# Patient Record
Sex: FEMALE
[De-identification: names, ages, dates, MRNs, and addresses within clinical notes are randomized per-mention and may not be internally consistent; named-entity substitution may affect disease eponyms.]

---

## 2019-11-13 ENCOUNTER — HOSPITAL ENCOUNTER (OUTPATIENT)
Dept: HOSPITAL 92 - CT | Age: 64
Discharge: HOME | End: 2019-11-13
Attending: FAMILY MEDICINE
Payer: MEDICARE

## 2019-11-13 DIAGNOSIS — K66.8: Primary | ICD-10-CM

## 2019-11-13 DIAGNOSIS — R59.0: ICD-10-CM

## 2019-11-13 LAB — ESTIMATED GFR-MDRD - POC: 50

## 2019-11-13 PROCEDURE — 82565 ASSAY OF CREATININE: CPT

## 2019-11-13 PROCEDURE — 74177 CT ABD & PELVIS W/CONTRAST: CPT

## 2019-11-13 NOTE — CT
CT OF ABDOMEN AND PELVIS:



DATE: 11/13/2019.



HISTORY: 

Disorder of retroperitoneum, history of right renal cell carcinoma and left-sided breast cancer. Eval
uate for a retroperitoneal mass lesion.



TECHNIQUE:

Axial CT imaging at 5 mm intervals from lung bases through pubic symphysis with IV and oral contrast.
 Coronal and sagittal reformatted imaging obtained.



FINDINGS: 

Partially visualized left breast implant present consistent with history of left mastectomy. Imaged l
shannon bases unremarkable. No free intraperitoneal air or fluid.



Hepatic parenchyma is diffusely hypodense, evidence of steatosis. Cholecystectomy clips are present. 
The spleen, pancreas, adrenal glands, and left kidney demonstrate no acute findings. The right

kidney is absent, consistent with the provided history of right nephrectomy secondary to malignancy. 
There is bulky lymphadenopathy within the retroperitoneum on the right. This includes

lymphadenopathy measuring 7.1 x 4.3 cm in the aortocaval/pericaval region. Additional bulky enlarged 
lymph nodes are seen within the aortocaval region measuring up to 3.8 cm.



There is a 1.6 cm cyst within the lower pole of the left kidney, with Hounsfield units in the 12-14 r
ankush.



Uterus appears surgically absent.



Sigmoid diverticulosis without evidence for diverticulitis. There is a ventral hernia in the periumbi
lical region containing nondilated mid transverse colon. Appendix unremarkable.



Osseous structures demonstrate no worrisome lytic or blastic lesions.



IMPRESSION: 

Bulky retroperitoneal lymphadenopathy on the right, highly suspicious for metastatic disease.



Transcribed Date/Time: 11/13/2019 11:09 AM



Reported By: Fitz Singh 

Electronically Signed:  11/13/2019 5:08 PM

## 2019-12-06 ENCOUNTER — HOSPITAL ENCOUNTER (OUTPATIENT)
Dept: HOSPITAL 92 - CT | Age: 64
Discharge: HOME | End: 2019-12-06
Attending: FAMILY MEDICINE
Payer: MEDICARE

## 2019-12-06 VITALS — TEMPERATURE: 98.5 F | DIASTOLIC BLOOD PRESSURE: 90 MMHG | SYSTOLIC BLOOD PRESSURE: 147 MMHG

## 2019-12-06 VITALS — BODY MASS INDEX: 37.5 KG/M2

## 2019-12-06 DIAGNOSIS — Z99.89: ICD-10-CM

## 2019-12-06 DIAGNOSIS — Z90.5: ICD-10-CM

## 2019-12-06 DIAGNOSIS — Z79.84: ICD-10-CM

## 2019-12-06 DIAGNOSIS — Z85.3: ICD-10-CM

## 2019-12-06 DIAGNOSIS — C78.6: Primary | ICD-10-CM

## 2019-12-06 DIAGNOSIS — I10: ICD-10-CM

## 2019-12-06 DIAGNOSIS — Z79.899: ICD-10-CM

## 2019-12-06 DIAGNOSIS — E11.9: ICD-10-CM

## 2019-12-06 DIAGNOSIS — Z85.528: ICD-10-CM

## 2019-12-06 DIAGNOSIS — C80.1: ICD-10-CM

## 2019-12-06 DIAGNOSIS — Z90.12: ICD-10-CM

## 2019-12-06 DIAGNOSIS — G47.30: ICD-10-CM

## 2019-12-06 LAB
APTT PPP: 33.1 SEC (ref 22.9–36.1)
BASOPHILS # BLD AUTO: 0.1 THOU/UL (ref 0–0.2)
BASOPHILS NFR BLD AUTO: 0.7 % (ref 0–1)
EOSINOPHIL # BLD AUTO: 0.5 THOU/UL (ref 0–0.7)
EOSINOPHIL NFR BLD AUTO: 4.2 % (ref 0–10)
HGB BLD-MCNC: 13.4 G/DL (ref 12–16)
INR PPP: 1
LYMPHOCYTES # BLD: 3.4 THOU/UL (ref 1.2–3.4)
LYMPHOCYTES NFR BLD AUTO: 31.1 % (ref 21–51)
MCH RBC QN AUTO: 30.3 PG (ref 27–31)
MCV RBC AUTO: 91.1 FL (ref 78–98)
MONOCYTES # BLD AUTO: 0.6 THOU/UL (ref 0.11–0.59)
MONOCYTES NFR BLD AUTO: 5.5 % (ref 0–10)
NEUTROPHILS # BLD AUTO: 6.4 THOU/UL (ref 1.4–6.5)
NEUTROPHILS NFR BLD AUTO: 58.5 % (ref 42–75)
PLATELET # BLD AUTO: 245 THOU/UL (ref 130–400)
PROTHROMBIN TIME: 12.9 SEC (ref 12–14.7)
RBC # BLD AUTO: 4.42 MILL/UL (ref 4.2–5.4)
WBC # BLD AUTO: 11 THOU/UL (ref 4.8–10.8)

## 2019-12-06 PROCEDURE — 85610 PROTHROMBIN TIME: CPT

## 2019-12-06 PROCEDURE — 85730 THROMBOPLASTIN TIME PARTIAL: CPT

## 2019-12-06 PROCEDURE — 49180 BIOPSY ABDOMINAL MASS: CPT

## 2019-12-06 PROCEDURE — 88334 PATH CONSLTJ SURG CYTO XM EA: CPT

## 2019-12-06 PROCEDURE — 88341 IMHCHEM/IMCYTCHM EA ADD ANTB: CPT

## 2019-12-06 PROCEDURE — 88342 IMHCHEM/IMCYTCHM 1ST ANTB: CPT

## 2019-12-06 PROCEDURE — 88305 TISSUE EXAM BY PATHOLOGIST: CPT

## 2019-12-06 PROCEDURE — 36415 COLL VENOUS BLD VENIPUNCTURE: CPT

## 2019-12-06 PROCEDURE — 77012 CT SCAN FOR NEEDLE BIOPSY: CPT

## 2019-12-06 PROCEDURE — 85025 COMPLETE CBC W/AUTO DIFF WBC: CPT

## 2019-12-06 PROCEDURE — 0WBH3ZX EXCISION OF RETROPERITONEUM, PERCUTANEOUS APPROACH, DIAGNOSTIC: ICD-10-PCS | Performed by: RADIOLOGY

## 2019-12-06 PROCEDURE — 88333 PATH CONSLTJ SURG CYTO XM 1: CPT

## 2019-12-06 NOTE — CT
CT-guided retroperitoneal mass biopsy



INDICATION: History of bulky retroperitoneal lymphadenopathy seen on a comparison CT evaluation 11/13
/2019. Patient has a history of prior right renal cell carcinoma and left breast cancer.



TECHNIQUE: Informed consent was obtained. Site overlying the right posterior lateral aspect of the festus
dy was marked with a grid. The patient was placed prone on the CT from table. Preprocedure CT

images were obtained identifying the bulky retroperitoneal lymph nodes. Site overlying the largest ly
mph node adjacent to the inferior margin of the liver was marked. Patient underwent conscious

sedation under guidance of the radiology nurse. The patient received 50 mcg of IV fentanyl and 1 mg o
f IV Versed. Buffered 1% lidocaine was measured overlying subcutaneous tissues. A small dermatotomy

was made. A 17-gauge trocar needle was guided down to the lesion. 3 separate core samples were obtain
ed utilizing an 18-gauge core biopsy gun. Pathology was on-site to verify adequacy of tissue

sampling. Following the last core sample, the trocar needle stylet was slightly removed to allow bloo
d to coagulate within the trocar needle. After approximately 2 minutes, the blood plug was then

advanced into the level of the biopsied retroperitoneal lymph node. The needle was removed. Pressure 
was held at the biopsy site until hemostasis was obtained. Postprocedural images were performed

which demonstrated a small amount of retroperitoneal hemorrhage adjacent to the largest retroperitone
al lymph node sampled. The patient tolerated the biopsy without difficulty. 2 of the core specimen

were submitted in formalin. One core specimen was submitted in flow cytometry solution.



IMPRESSION: Successful CT-guided retroperitoneal lymph node core sampling.



Reported By: Broderick Bright 

Electronically Signed:  12/6/2019 1:38 PM

## 2019-12-26 ENCOUNTER — HOSPITAL ENCOUNTER (OUTPATIENT)
Dept: HOSPITAL 92 - CT | Age: 64
Discharge: HOME | End: 2019-12-26
Attending: INTERNAL MEDICINE
Payer: MEDICARE

## 2019-12-26 DIAGNOSIS — Z87.81: ICD-10-CM

## 2019-12-26 DIAGNOSIS — Z90.5: ICD-10-CM

## 2019-12-26 DIAGNOSIS — C64.9: Primary | ICD-10-CM

## 2019-12-26 DIAGNOSIS — J32.9: ICD-10-CM

## 2019-12-26 DIAGNOSIS — C78.6: ICD-10-CM

## 2019-12-26 LAB — ESTIMATED GFR-MDRD - POC: 45

## 2019-12-26 PROCEDURE — 71260 CT THORAX DX C+: CPT

## 2019-12-26 PROCEDURE — 82565 ASSAY OF CREATININE: CPT

## 2019-12-26 PROCEDURE — A9503 TC99M MEDRONATE: HCPCS

## 2019-12-26 PROCEDURE — 78306 BONE IMAGING WHOLE BODY: CPT

## 2019-12-26 PROCEDURE — 70470 CT HEAD/BRAIN W/O & W/DYE: CPT

## 2019-12-26 NOTE — CT
CT CHEST WITH CONTRAST:

 

Date:  12/26/19 

 

INDICATION:

Malignant neoplasm right kidney and secondary malignant neoplasm retroperitoneum. Remote history of l
eft breast cancer. 

 

No comparison chest CT. 

 

FINDINGS:

The lung fields are clear. No evidence of infiltrate or effusion. No pulmonary mass or nodule identif
ied. Mediastinum unremarkable. No adenopathy. Thyroid unremarkable. 

 

Images through the upper abdomen show unremarkable liver, spleen, and visualized pancreas. The previo
usly described mass in the right retroperitoneum is partially imaged. 

 

Osseous structures unremarkable. 

 

There is a left breast prosthesis. No evidence of axillary adenopathy. 

 

IMPRESSION: 

Unremarkable CT chest. 

 

 

POS: [facility]

## 2019-12-26 NOTE — CT
CT of the head with and without contrast:

12/26/2019



COMPARISON: None



HISTORY: Breast cancer, "kidney cancer"



TECHNIQUE: Axial CT imaging at 5 mm intervals from vertex through skull base with and without IV cont
rast.



FINDINGS: There is complete opacification of the frontal sinus and anterior ethmoid air cells on the 
right with mild expansion of the paranasal sinuses. This can be seen on the basis of

polyposis/chronic sinusitis and/or fungal infection.



No worrisome lytic or blastic bone lesion identified.



The noncontrast enhanced imaging demonstrates no intracranial hemorrhage, midline shift, mass effect,
 or ventricular enlargement.



The postcontrast imaging demonstrates no abnormal enhancement within the brain parenchyma to suggest 
the presence of intracranial metastatic disease. This study was not tailored to evaluate the

arterial structures of the brain.



On the axial imaging there is marked thinning which could signify a focal area of the dehiscence invo
lving the posterior wall of the expanded right frontal sinus. This could be best assessed with

follow-up CT examination of the paranasal sinuses.



IMPRESSION: No intracranial hemorrhage or evidence of intracranial metastatic disease.



Paranasal sinus disease with possible posterior dehiscence of right frontal sinus.



Reported By: Fitz Singh 

Electronically Signed:  12/26/2019 9:31 AM

## 2019-12-26 NOTE — NM
NUCLEAR MEDICINE BONE SCAN WHOLE BODY:

(Skeletal scintigraphy)



DATE:

12/26/2019



HISTORY:

64-year-old female with breast cancer and renal cell carcinoma.



TECHNIQUE:

IV injection of technetium 99m-MDP: 31.3 mCi

3 hour delayed whole body skeletal scintigraphy in anterior and posterior views.



FINDINGS:

There is a small focus of increased uptake at the right mid fibular diaphysis, which corresponds to t
he fracture demonstrated on prior radiograph of 7/30/2005.

Otherwise, there are no foci of asymmetrically increased uptake that are particularly suspicious for 
bone metastases.

The right kidney is absent.

There is hyperostosis frontalis interna.



IMPRESSION:



1. No compelling evidence of skeletal metastasis.

2. Evidence of old right mid fibular shaft fracture.

3. Status post right nephrectomy.



Reported By: Raymond Webber 

Electronically Signed:  12/26/2019 1:17 PM

## 2020-04-08 ENCOUNTER — HOSPITAL ENCOUNTER (OUTPATIENT)
Dept: HOSPITAL 92 - CT | Age: 65
Discharge: HOME | End: 2020-04-08
Attending: INTERNAL MEDICINE
Payer: MEDICARE

## 2020-04-08 DIAGNOSIS — M47.816: ICD-10-CM

## 2020-04-08 DIAGNOSIS — C64.1: Primary | ICD-10-CM

## 2020-04-08 DIAGNOSIS — C78.6: ICD-10-CM

## 2020-04-08 DIAGNOSIS — K42.9: ICD-10-CM

## 2020-04-08 DIAGNOSIS — J98.4: ICD-10-CM

## 2020-04-08 LAB — ESTIMATED GFR-MDRD - POC: 45

## 2020-04-08 PROCEDURE — 71260 CT THORAX DX C+: CPT

## 2020-04-08 PROCEDURE — 74177 CT ABD & PELVIS W/CONTRAST: CPT

## 2020-04-08 PROCEDURE — 82565 ASSAY OF CREATININE: CPT

## 2020-04-08 NOTE — CT
CT chest with IV contrast

CT abdomen and pelvis with IV and oral contrast



HISTORY: Malignant neoplasm kidney. Restaging.



COMPARISON: 11/13/2019.



FINDINGS: Lungs are well-inflated. Mild linear scarring at the left posterior lung base. Left breast 
implant stable.



Gallbladder is surgically absent. The 1.5 cm cyst at the inferior pole of the left kidney is unchange
d. Non-obstructed sigmoid colon protruding through an umbilical hernia is again demonstrated.



A lobular heterogeneous soft tissue density mass within the right retroperitoneum now measures up to 
13.8 cm length by 8.1 cm width by 8.1 cm depth. The 2 areas of adenopathy on the prior study have

become confluent. There is heterogeneous fluid density component involving the inferior margin, more 
pronounced on the current study. Significant compression of the inferior vena cava has progressed.

There is subtle linear decreased density within the common iliac arteries and lower inferior vena cav
a, although, rather than thrombus, is it is favored to represent differential contrast

opacification due to inflow of the internal iliac veins..



Degenerative changes lumbar spine.



  



IMPRESSION :

Significant interval enlargement of the retroperitoneal adenopathy/mass, now a confluent process. It 
does significantly compress the inferior vena cava, possibly resulting in partial obstruction of

flow.



Umbilical hernia containing nonobstructed transverse colon.



Chronic-type findings are stable.



Reported By: CORINNA Werner 

Electronically Signed:  4/8/2020 11:57 AM

## 2020-05-29 ENCOUNTER — HOSPITAL ENCOUNTER (OUTPATIENT)
Dept: HOSPITAL 92 - LABBT | Age: 65
Discharge: HOME | End: 2020-05-29
Attending: SURGERY
Payer: MEDICARE

## 2020-05-29 DIAGNOSIS — Z01.818: Primary | ICD-10-CM

## 2020-05-29 DIAGNOSIS — Z11.59: ICD-10-CM

## 2020-05-29 DIAGNOSIS — I70.0: ICD-10-CM

## 2020-05-29 LAB
ANION GAP SERPL CALC-SCNC: 14 MMOL/L (ref 10–20)
BASOPHILS # BLD AUTO: 0.1 THOU/UL (ref 0–0.2)
BASOPHILS NFR BLD AUTO: 1.1 % (ref 0–1)
BUN SERPL-MCNC: 40 MG/DL (ref 9.8–20.1)
CALCIUM SERPL-MCNC: 9.9 MG/DL (ref 7.8–10.44)
CHLORIDE SERPL-SCNC: 108 MMOL/L (ref 98–107)
CO2 SERPL-SCNC: 21 MMOL/L (ref 23–31)
CREAT CL PREDICTED SERPL C-G-VRATE: 0 ML/MIN (ref 70–130)
EOSINOPHIL # BLD AUTO: 0.9 THOU/UL (ref 0–0.7)
EOSINOPHIL NFR BLD AUTO: 7.7 % (ref 0–10)
GLUCOSE SERPL-MCNC: 143 MG/DL (ref 80–115)
HGB BLD-MCNC: 12.5 G/DL (ref 12–16)
LYMPHOCYTES # BLD: 2.9 THOU/UL (ref 1.2–3.4)
LYMPHOCYTES NFR BLD AUTO: 25.3 % (ref 21–51)
MCH RBC QN AUTO: 34.7 PG (ref 27–31)
MCV RBC AUTO: 104 FL (ref 78–98)
MONOCYTES # BLD AUTO: 0.7 THOU/UL (ref 0.11–0.59)
MONOCYTES NFR BLD AUTO: 6.1 % (ref 0–10)
NEUTROPHILS # BLD AUTO: 6.8 THOU/UL (ref 1.4–6.5)
NEUTROPHILS NFR BLD AUTO: 59.8 % (ref 42–75)
PLATELET # BLD AUTO: 298 THOU/UL (ref 130–400)
POTASSIUM SERPL-SCNC: 4.2 MMOL/L (ref 3.5–5.1)
RBC # BLD AUTO: 3.61 MILL/UL (ref 4.2–5.4)
SODIUM SERPL-SCNC: 139 MMOL/L (ref 136–145)
WBC # BLD AUTO: 11.3 THOU/UL (ref 4.8–10.8)

## 2020-05-29 PROCEDURE — 71046 X-RAY EXAM CHEST 2 VIEWS: CPT

## 2020-05-29 PROCEDURE — 85025 COMPLETE CBC W/AUTO DIFF WBC: CPT

## 2020-05-29 PROCEDURE — 80048 BASIC METABOLIC PNL TOTAL CA: CPT

## 2020-05-29 PROCEDURE — 87635 SARS-COV-2 COVID-19 AMP PRB: CPT

## 2020-05-29 PROCEDURE — U0003 INFECTIOUS AGENT DETECTION BY NUCLEIC ACID (DNA OR RNA); SEVERE ACUTE RESPIRATORY SYNDROME CORONAVIRUS 2 (SARS-COV-2) (CORONAVIRUS DISEASE [COVID-19]), AMPLIFIED PROBE TECHNIQUE, MAKING USE OF HIGH THROUGHPUT TECHNOLOGIES AS DESCRIBED BY CMS-2020-01-R: HCPCS

## 2020-05-29 NOTE — RAD
EXAM:

CHEST TWO VIEWS:

5/29/20

 

HISTORY: 

Preoperative evaluation.

 

FINDINGS: 

Heart size is within normal limits. Lungs are clear. No confluent pneumonia, overt edema or pleural e
ffusion.

 

IMPRESSION: 

No acute intrathoracic disease. Atherosclerosis of the aorta. 

 

POS: [facility]

## 2020-08-03 ENCOUNTER — HOSPITAL ENCOUNTER (OUTPATIENT)
Dept: HOSPITAL 92 - BICULT | Age: 65
Discharge: HOME | End: 2020-08-03
Attending: INTERNAL MEDICINE
Payer: MEDICARE

## 2020-08-03 DIAGNOSIS — N64.1: ICD-10-CM

## 2020-08-03 DIAGNOSIS — N63.24: ICD-10-CM

## 2020-08-03 DIAGNOSIS — R22.2: Primary | ICD-10-CM

## 2020-08-03 NOTE — ULT
SOFT TISSUE ULTRASOUND CHEST WALL:

 

History: 

Palpable mass in the medial aspect of the left anterior chest wall. Patient has had prior mastectomy 
with an augmentation prosthesis implant in place. 

 

FINDINGS: 

The palpable mass is noted at approximately the 9 o'clock aspect of the breast. It measures 1 x 1.7 x
 1.8 cm in size and appears to contain both some echogenic and cystic components. No evidence for blo
od flow within this mass. There are multiple other smaller circumscribed anechoic and slightly  hypoe
choic nodular foci within the left breast tissue as well measuring up to approximately .5 cm in size.
 These are seen at 12 o'clock, 1 o'clock, 2 o'clock, and 3 o'clock positions. 

 

Review of prior chest CT scan dated 4-8-2020, in the medial aspect of the left breast there is an ova
l fat containing circumscribed mass in the inner aspect of the left breast on the prior CT which I fe
el corresponds to the palpable finding at 9 o'clock on today's breast ultrasound examination, consist
ent with that of a fat necrosis. The other smaller anechoic and hypoechoic nodular foci also have a t
ypical appearance for small areas of fat necrosis. 

 

IMPRESSION: 

Mixed echogenic and non-echogenic mass in the 9 o'clock position of the left breast accounting for th
e palpable finding corresponds to a circumscribed fatty mass on prior CT scan, evidence for a focal a
keerthi of fat necrosis. 

 

Multiple other smaller circumscribed hypoechoic and anechoic foci in the left breast also are evidenc
e for multiple small areas of fat necrosis. 

 

Findings were discussed with the patient at the time of the examination.  BIRADS category 2 - benign 
findings. Continued routine surveillance.  

 

POS: [facility]

## 2021-01-14 ENCOUNTER — HOSPITAL ENCOUNTER (EMERGENCY)
Dept: HOSPITAL 92 - ERS | Age: 66
Discharge: HOME | End: 2021-01-14
Payer: MEDICARE

## 2021-01-14 DIAGNOSIS — Z79.84: ICD-10-CM

## 2021-01-14 DIAGNOSIS — F17.210: ICD-10-CM

## 2021-01-14 DIAGNOSIS — I10: ICD-10-CM

## 2021-01-14 DIAGNOSIS — R19.00: Primary | ICD-10-CM

## 2021-01-14 DIAGNOSIS — E11.9: ICD-10-CM

## 2021-01-14 LAB
ALBUMIN SERPL BCG-MCNC: 3.7 G/DL (ref 3.4–4.8)
ALP SERPL-CCNC: 104 U/L (ref 40–110)
ALT SERPL W P-5'-P-CCNC: 25 U/L (ref 8–55)
ANION GAP SERPL CALC-SCNC: 17 MMOL/L (ref 10–20)
AST SERPL-CCNC: 36 U/L (ref 5–34)
BASOPHILS # BLD AUTO: 0.1 THOU/UL (ref 0–0.2)
BASOPHILS NFR BLD AUTO: 0.3 % (ref 0–1)
BILIRUB SERPL-MCNC: 0.7 MG/DL (ref 0.2–1.2)
BUN SERPL-MCNC: 19 MG/DL (ref 9.8–20.1)
CALCIUM SERPL-MCNC: 9.6 MG/DL (ref 7.8–10.44)
CHLORIDE SERPL-SCNC: 101 MMOL/L (ref 98–107)
CO2 SERPL-SCNC: 21 MMOL/L (ref 23–31)
CREAT CL PREDICTED SERPL C-G-VRATE: 0 ML/MIN (ref 70–130)
EOSINOPHIL # BLD AUTO: 0.1 THOU/UL (ref 0–0.7)
EOSINOPHIL NFR BLD AUTO: 0.4 % (ref 0–10)
GLOBULIN SER CALC-MCNC: 4 G/DL (ref 2.4–3.5)
GLUCOSE SERPL-MCNC: 192 MG/DL (ref 80–115)
HGB BLD-MCNC: 12.6 G/DL (ref 12–16)
LEUKOCYTE ESTERASE UR QL STRIP.AUTO: 75 LEU/UL
LIPASE SERPL-CCNC: 16 U/L (ref 8–78)
LYMPHOCYTES # BLD: 1.5 THOU/UL (ref 1.2–3.4)
LYMPHOCYTES NFR BLD AUTO: 8.7 % (ref 21–51)
MCH RBC QN AUTO: 27.4 PG (ref 27–31)
MCV RBC AUTO: 86.2 FL (ref 78–98)
MONOCYTES # BLD AUTO: 1.3 THOU/UL (ref 0.11–0.59)
MONOCYTES NFR BLD AUTO: 7.4 % (ref 0–10)
NEUTROPHILS # BLD AUTO: 14.7 THOU/UL (ref 1.4–6.5)
NEUTROPHILS NFR BLD AUTO: 83.2 % (ref 42–75)
PLATELET # BLD AUTO: 182 THOU/UL (ref 130–400)
POTASSIUM SERPL-SCNC: 3.9 MMOL/L (ref 3.5–5.1)
PROT UR STRIP.AUTO-MCNC: 50 MG/DL
RBC # BLD AUTO: 4.6 MILL/UL (ref 4.2–5.4)
RBC UR QL AUTO: (no result) HPF (ref 0–3)
SODIUM SERPL-SCNC: 135 MMOL/L (ref 136–145)
SP GR UR STRIP: 1.02 (ref 1–1.04)
WBC # BLD AUTO: 17.7 THOU/UL (ref 4.8–10.8)
WBC UR QL AUTO: (no result) HPF (ref 0–3)

## 2021-01-14 PROCEDURE — 80053 COMPREHEN METABOLIC PANEL: CPT

## 2021-01-14 PROCEDURE — 96375 TX/PRO/DX INJ NEW DRUG ADDON: CPT

## 2021-01-14 PROCEDURE — 81003 URINALYSIS AUTO W/O SCOPE: CPT

## 2021-01-14 PROCEDURE — 81015 MICROSCOPIC EXAM OF URINE: CPT

## 2021-01-14 PROCEDURE — 85025 COMPLETE CBC W/AUTO DIFF WBC: CPT

## 2021-01-14 PROCEDURE — 96374 THER/PROPH/DIAG INJ IV PUSH: CPT

## 2021-01-14 PROCEDURE — 74177 CT ABD & PELVIS W/CONTRAST: CPT

## 2021-01-14 PROCEDURE — 83690 ASSAY OF LIPASE: CPT

## 2021-01-14 NOTE — CT
CT ABDOMEN PELVIS WITH IV CONTRAST:



HISTORY: Right-sided abdominal pain. Right flank pain. History of right renal cell carcinoma and left
 breast cancer.



COMPARISON: 4/8/2020



FINDINGS:



 Left breast implant and mild linear scarring at the left posterior lung base are again seen. The pat
ient is post cholecystectomy. Changes of hepatic steatosis are again seen without hepatic mass or

abnormal biliary ductal dilatation. The spleen, pancreas and adrenal glands are unremarkable. The pat
ient is status post right nephrectomy. 1.5 cm cyst in the left kidney is stable.



The right-sided bulky retroperitoneal lymphadenopathy shows interval worsening, currently measuring 8
.9 x 9.1 cm. There is continued compression of the inferior vena cava. No free air or free fluid is

seen in the abdomen or pelvis. There are vascular calcifications without evidence of aneurysmal dilat
ation of the abdominal aorta. There are degenerative changes in the spine without osteolytic or

osteoblastic lesions.



The small bowel loops are not abnormally dilated. The ventral hernia containing a portion of the garcia
sverse colon is again seen. There is no evidence of bowel obstruction.



IMPRESSION: Interval worsening of right retroperitoneal lymphadenopathy since 4/8/2020.



Reported By: Bal Ruiz 

Electronically Signed:  1/14/2021 12:50 PM

## 2021-03-16 ENCOUNTER — HOSPITAL ENCOUNTER (OUTPATIENT)
Dept: HOSPITAL 92 - ERS | Age: 66
Setting detail: OBSERVATION
LOS: 1 days | Discharge: HOME | End: 2021-03-17
Attending: FAMILY MEDICINE | Admitting: FAMILY MEDICINE
Payer: MEDICARE

## 2021-03-16 VITALS — BODY MASS INDEX: 31.6 KG/M2

## 2021-03-16 DIAGNOSIS — R10.9: ICD-10-CM

## 2021-03-16 DIAGNOSIS — C77.2: ICD-10-CM

## 2021-03-16 DIAGNOSIS — G89.29: ICD-10-CM

## 2021-03-16 DIAGNOSIS — Z20.822: ICD-10-CM

## 2021-03-16 DIAGNOSIS — R07.0: ICD-10-CM

## 2021-03-16 DIAGNOSIS — C64.1: ICD-10-CM

## 2021-03-16 DIAGNOSIS — R63.0: Primary | ICD-10-CM

## 2021-03-16 DIAGNOSIS — F17.210: ICD-10-CM

## 2021-03-16 DIAGNOSIS — Z85.3: ICD-10-CM

## 2021-03-16 DIAGNOSIS — E83.52: ICD-10-CM

## 2021-03-16 DIAGNOSIS — R74.01: ICD-10-CM

## 2021-03-16 DIAGNOSIS — I12.9: ICD-10-CM

## 2021-03-16 DIAGNOSIS — Z79.84: ICD-10-CM

## 2021-03-16 DIAGNOSIS — R94.31: ICD-10-CM

## 2021-03-16 DIAGNOSIS — Z79.899: ICD-10-CM

## 2021-03-16 DIAGNOSIS — E11.22: ICD-10-CM

## 2021-03-16 DIAGNOSIS — N18.31: ICD-10-CM

## 2021-03-16 LAB
ALBUMIN SERPL BCG-MCNC: 3.5 G/DL (ref 3.4–4.8)
ALP SERPL-CCNC: 249 U/L (ref 40–110)
ALT SERPL W P-5'-P-CCNC: 101 U/L (ref 8–55)
ANION GAP SERPL CALC-SCNC: 18 MMOL/L (ref 10–20)
AST SERPL-CCNC: 61 U/L (ref 5–34)
BASOPHILS # BLD AUTO: 0 THOU/UL (ref 0–0.2)
BASOPHILS NFR BLD AUTO: 0.3 % (ref 0–1)
BILIRUB SERPL-MCNC: 0.8 MG/DL (ref 0.2–1.2)
BUN SERPL-MCNC: 52 MG/DL (ref 9.8–20.1)
CALCIUM SERPL-MCNC: 10.7 MG/DL (ref 7.8–10.44)
CHLORIDE SERPL-SCNC: 100 MMOL/L (ref 98–107)
CK SERPL-CCNC: 47 U/L (ref 29–168)
CO2 SERPL-SCNC: 20 MMOL/L (ref 23–31)
CREAT CL PREDICTED SERPL C-G-VRATE: 0 ML/MIN (ref 70–130)
EOSINOPHIL # BLD AUTO: 0.2 THOU/UL (ref 0–0.7)
EOSINOPHIL NFR BLD AUTO: 4.1 % (ref 0–10)
GLOBULIN SER CALC-MCNC: 5 G/DL (ref 2.4–3.5)
GLUCOSE SERPL-MCNC: 141 MG/DL (ref 80–115)
HGB BLD-MCNC: 14.3 G/DL (ref 12–16)
LYMPHOCYTES # BLD: 1 THOU/UL (ref 1.2–3.4)
LYMPHOCYTES NFR BLD AUTO: 18.4 % (ref 21–51)
MAGNESIUM SERPL-MCNC: 1.8 MG/DL (ref 1.6–2.6)
MCH RBC QN AUTO: 29.4 PG (ref 27–31)
MCV RBC AUTO: 88 FL (ref 78–98)
MONOCYTES # BLD AUTO: 0.7 THOU/UL (ref 0.11–0.59)
MONOCYTES NFR BLD AUTO: 13.7 % (ref 0–10)
NEUTROPHILS # BLD AUTO: 3.4 THOU/UL (ref 1.4–6.5)
NEUTROPHILS NFR BLD AUTO: 63.5 % (ref 42–75)
PLATELET # BLD AUTO: 174 THOU/UL (ref 130–400)
POTASSIUM SERPL-SCNC: 3.9 MMOL/L (ref 3.5–5.1)
PROT UR STRIP.AUTO-MCNC: 30 MG/DL
RBC # BLD AUTO: 4.85 MILL/UL (ref 4.2–5.4)
RBC UR QL AUTO: (no result) HPF (ref 0–3)
SODIUM SERPL-SCNC: 134 MMOL/L (ref 136–145)
SP GR UR STRIP: 1.03 (ref 1–1.04)
WBC # BLD AUTO: 5.4 THOU/UL (ref 4.8–10.8)
WBC UR QL AUTO: (no result) HPF (ref 0–3)

## 2021-03-16 PROCEDURE — 96375 TX/PRO/DX INJ NEW DRUG ADDON: CPT

## 2021-03-16 PROCEDURE — 36416 COLLJ CAPILLARY BLOOD SPEC: CPT

## 2021-03-16 PROCEDURE — 83036 HEMOGLOBIN GLYCOSYLATED A1C: CPT

## 2021-03-16 PROCEDURE — 36415 COLL VENOUS BLD VENIPUNCTURE: CPT

## 2021-03-16 PROCEDURE — 71045 X-RAY EXAM CHEST 1 VIEW: CPT

## 2021-03-16 PROCEDURE — 96374 THER/PROPH/DIAG INJ IV PUSH: CPT

## 2021-03-16 PROCEDURE — 84100 ASSAY OF PHOSPHORUS: CPT

## 2021-03-16 PROCEDURE — 81003 URINALYSIS AUTO W/O SCOPE: CPT

## 2021-03-16 PROCEDURE — 81015 MICROSCOPIC EXAM OF URINE: CPT

## 2021-03-16 PROCEDURE — G0378 HOSPITAL OBSERVATION PER HR: HCPCS

## 2021-03-16 PROCEDURE — 85025 COMPLETE CBC W/AUTO DIFF WBC: CPT

## 2021-03-16 PROCEDURE — 99285 EMERGENCY DEPT VISIT HI MDM: CPT

## 2021-03-16 PROCEDURE — U0003 INFECTIOUS AGENT DETECTION BY NUCLEIC ACID (DNA OR RNA); SEVERE ACUTE RESPIRATORY SYNDROME CORONAVIRUS 2 (SARS-COV-2) (CORONAVIRUS DISEASE [COVID-19]), AMPLIFIED PROBE TECHNIQUE, MAKING USE OF HIGH THROUGHPUT TECHNOLOGIES AS DESCRIBED BY CMS-2020-01-R: HCPCS

## 2021-03-16 PROCEDURE — U0005 INFEC AGEN DETEC AMPLI PROBE: HCPCS

## 2021-03-16 PROCEDURE — 84484 ASSAY OF TROPONIN QUANT: CPT

## 2021-03-16 PROCEDURE — 82962 GLUCOSE BLOOD TEST: CPT

## 2021-03-16 PROCEDURE — 93005 ELECTROCARDIOGRAM TRACING: CPT

## 2021-03-16 PROCEDURE — 87635 SARS-COV-2 COVID-19 AMP PRB: CPT

## 2021-03-16 PROCEDURE — 74177 CT ABD & PELVIS W/CONTRAST: CPT

## 2021-03-16 PROCEDURE — 97139 UNLISTED THERAPEUTIC PX: CPT

## 2021-03-16 PROCEDURE — 83735 ASSAY OF MAGNESIUM: CPT

## 2021-03-16 PROCEDURE — 80053 COMPREHEN METABOLIC PANEL: CPT

## 2021-03-16 PROCEDURE — 82550 ASSAY OF CK (CPK): CPT

## 2021-03-17 VITALS — TEMPERATURE: 98.2 F | SYSTOLIC BLOOD PRESSURE: 137 MMHG | DIASTOLIC BLOOD PRESSURE: 83 MMHG

## 2021-03-17 LAB
ALBUMIN SERPL BCG-MCNC: 3.1 G/DL (ref 3.4–4.8)
ALP SERPL-CCNC: 218 U/L (ref 40–110)
ALT SERPL W P-5'-P-CCNC: 80 U/L (ref 8–55)
ANION GAP SERPL CALC-SCNC: 13 MMOL/L (ref 10–20)
AST SERPL-CCNC: 53 U/L (ref 5–34)
BASOPHILS # BLD AUTO: 0 THOU/UL (ref 0–0.2)
BASOPHILS NFR BLD AUTO: 0.3 % (ref 0–1)
BILIRUB SERPL-MCNC: 0.8 MG/DL (ref 0.2–1.2)
BUN SERPL-MCNC: 32 MG/DL (ref 9.8–20.1)
CALCIUM SERPL-MCNC: 9.9 MG/DL (ref 7.8–10.44)
CHLORIDE SERPL-SCNC: 106 MMOL/L (ref 98–107)
CO2 SERPL-SCNC: 22 MMOL/L (ref 23–31)
CREAT CL PREDICTED SERPL C-G-VRATE: 91 ML/MIN (ref 70–130)
EOSINOPHIL # BLD AUTO: 0.2 THOU/UL (ref 0–0.7)
EOSINOPHIL NFR BLD AUTO: 2.2 % (ref 0–10)
GLOBULIN SER CALC-MCNC: 4.2 G/DL (ref 2.4–3.5)
GLUCOSE SERPL-MCNC: 85 MG/DL (ref 80–115)
HGB BLD-MCNC: 12.7 G/DL (ref 12–16)
LYMPHOCYTES # BLD: 1.1 THOU/UL (ref 1.2–3.4)
LYMPHOCYTES NFR BLD AUTO: 13 % (ref 21–51)
MAGNESIUM SERPL-MCNC: 1.8 MG/DL (ref 1.6–2.6)
MCH RBC QN AUTO: 30.3 PG (ref 27–31)
MCV RBC AUTO: 88.2 FL (ref 78–98)
MONOCYTES # BLD AUTO: 0.9 THOU/UL (ref 0.11–0.59)
MONOCYTES NFR BLD AUTO: 10.9 % (ref 0–10)
NEUTROPHILS # BLD AUTO: 6.1 THOU/UL (ref 1.4–6.5)
NEUTROPHILS NFR BLD AUTO: 73.7 % (ref 42–75)
PLATELET # BLD AUTO: 151 THOU/UL (ref 130–400)
POTASSIUM SERPL-SCNC: 3.9 MMOL/L (ref 3.5–5.1)
RBC # BLD AUTO: 4.18 MILL/UL (ref 4.2–5.4)
SODIUM SERPL-SCNC: 137 MMOL/L (ref 136–145)
WBC # BLD AUTO: 8.3 THOU/UL (ref 4.8–10.8)

## 2021-06-03 ENCOUNTER — HOSPITAL ENCOUNTER (EMERGENCY)
Dept: HOSPITAL 92 - ERS | Age: 66
Discharge: HOME | End: 2021-06-03
Payer: MEDICARE

## 2021-06-03 DIAGNOSIS — E11.9: ICD-10-CM

## 2021-06-03 DIAGNOSIS — F12.90: ICD-10-CM

## 2021-06-03 DIAGNOSIS — C78.89: ICD-10-CM

## 2021-06-03 DIAGNOSIS — R11.2: Primary | ICD-10-CM

## 2021-06-03 PROCEDURE — 80053 COMPREHEN METABOLIC PANEL: CPT

## 2021-06-03 PROCEDURE — 36415 COLL VENOUS BLD VENIPUNCTURE: CPT

## 2021-06-03 PROCEDURE — 82248 BILIRUBIN DIRECT: CPT

## 2021-06-03 PROCEDURE — 99283 EMERGENCY DEPT VISIT LOW MDM: CPT

## 2021-06-03 PROCEDURE — 84550 ASSAY OF BLOOD/URIC ACID: CPT

## 2021-06-03 PROCEDURE — 83615 LACTATE (LD) (LDH) ENZYME: CPT

## 2021-06-03 PROCEDURE — 84100 ASSAY OF PHOSPHORUS: CPT
